# Patient Record
Sex: FEMALE | Employment: UNEMPLOYED | URBAN - NONMETROPOLITAN AREA
[De-identification: names, ages, dates, MRNs, and addresses within clinical notes are randomized per-mention and may not be internally consistent; named-entity substitution may affect disease eponyms.]

---

## 2021-09-04 ENCOUNTER — HOSPITAL ENCOUNTER (OUTPATIENT)
Age: 66
End: 2021-09-04
Payer: MEDICARE

## 2021-09-04 ENCOUNTER — OFFICE VISIT (OUTPATIENT)
Dept: PRIMARY CARE CLINIC | Age: 66
End: 2021-09-04
Payer: MEDICARE

## 2021-09-04 ENCOUNTER — HOSPITAL ENCOUNTER (OUTPATIENT)
Dept: GENERAL RADIOLOGY | Age: 66
Discharge: HOME | End: 2021-09-06
Payer: MEDICARE

## 2021-09-04 VITALS
WEIGHT: 114.5 LBS | BODY MASS INDEX: 20.29 KG/M2 | DIASTOLIC BLOOD PRESSURE: 90 MMHG | OXYGEN SATURATION: 96 % | SYSTOLIC BLOOD PRESSURE: 160 MMHG | HEART RATE: 72 BPM | HEIGHT: 63 IN

## 2021-09-04 DIAGNOSIS — L08.9: Primary | ICD-10-CM

## 2021-09-04 DIAGNOSIS — S91.332A: ICD-10-CM

## 2021-09-04 DIAGNOSIS — S91.332A: Primary | ICD-10-CM

## 2021-09-04 DIAGNOSIS — L08.9: ICD-10-CM

## 2021-09-04 PROCEDURE — 73630 X-RAY EXAM OF FOOT: CPT

## 2021-09-04 PROCEDURE — 99212 OFFICE O/P EST SF 10 MIN: CPT | Performed by: FAMILY MEDICINE

## 2021-09-04 PROCEDURE — 99204 OFFICE O/P NEW MOD 45 MIN: CPT | Performed by: FAMILY MEDICINE

## 2021-09-04 RX ORDER — DOXYCYCLINE HYCLATE 100 MG
100 TABLET ORAL 2 TIMES DAILY
Qty: 20 TABLET | Refills: 0 | Status: SHIPPED | OUTPATIENT
Start: 2021-09-04 | End: 2021-09-14

## 2021-09-04 RX ORDER — B-COMPLEX WITH VITAMIN C
1 TABLET ORAL 2 TIMES DAILY WITH MEALS
COMMUNITY
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
## 2021-09-05 ASSESSMENT — ENCOUNTER SYMPTOMS: COLOR CHANGE: 1

## 2024-06-01 ENCOUNTER — APPOINTMENT (OUTPATIENT)
Dept: CT IMAGING | Age: 69
End: 2024-06-01
Payer: MEDICARE

## 2024-06-01 ENCOUNTER — HOSPITAL ENCOUNTER (EMERGENCY)
Age: 69
Discharge: HOME | End: 2024-06-01
Attending: SPECIALIST
Payer: MEDICARE

## 2024-06-01 VITALS
RESPIRATION RATE: 20 BRPM | BODY MASS INDEX: 22.27 KG/M2 | HEART RATE: 72 BPM | OXYGEN SATURATION: 97 % | HEIGHT: 63 IN | WEIGHT: 125.7 LBS | SYSTOLIC BLOOD PRESSURE: 178 MMHG | DIASTOLIC BLOOD PRESSURE: 95 MMHG

## 2024-06-01 DIAGNOSIS — G51.0 BELL'S PALSY: Primary | ICD-10-CM

## 2024-06-01 PROCEDURE — 6370000000 HC RX 637 (ALT 250 FOR IP): Performed by: SPECIALIST

## 2024-06-01 PROCEDURE — 70450 CT HEAD/BRAIN W/O DYE: CPT

## 2024-06-01 PROCEDURE — 99284 EMERGENCY DEPT VISIT MOD MDM: CPT

## 2024-06-01 RX ORDER — ACETAMINOPHEN 500 MG
1000 TABLET ORAL ONCE
Status: COMPLETED | OUTPATIENT
Start: 2024-06-01 | End: 2024-06-01

## 2024-06-01 RX ADMIN — ACETAMINOPHEN 1000 MG: 500 TABLET ORAL at 21:40

## 2024-06-01 ASSESSMENT — PAIN - FUNCTIONAL ASSESSMENT
PAIN_FUNCTIONAL_ASSESSMENT: 0-10
PAIN_FUNCTIONAL_ASSESSMENT: PREVENTS OR INTERFERES SOME ACTIVE ACTIVITIES AND ADLS

## 2024-06-01 ASSESSMENT — PAIN DESCRIPTION - DESCRIPTORS
DESCRIPTORS: SHOOTING
DESCRIPTORS: SHOOTING

## 2024-06-01 ASSESSMENT — PAIN DESCRIPTION - LOCATION
LOCATION: HEAD
LOCATION: HEAD

## 2024-06-01 ASSESSMENT — PAIN SCALES - GENERAL
PAINLEVEL_OUTOF10: 8
PAINLEVEL_OUTOF10: 8

## 2024-06-01 ASSESSMENT — PAIN DESCRIPTION - ORIENTATION
ORIENTATION: RIGHT
ORIENTATION: RIGHT

## 2024-06-01 ASSESSMENT — PAIN DESCRIPTION - FREQUENCY: FREQUENCY: CONTINUOUS

## 2024-06-02 NOTE — ED TRIAGE NOTES
Patient had acupuncture today and states they removed some blood from her head. It helped with headache some but headache has returned.

## 2024-06-02 NOTE — DISCHARGE INSTRUCTIONS
Please understand that at this time there is no evidence for a more serious underlying process, but that early in the process of an illness or injury, an emergency department workup can be falsely reassuring.  You should contact your family doctor within the next 48 hours for a follow up appointment    THANK YOU!!!    From [facility] and [facility]    On behalf of the Emergency Department staff at [facility], I would like to thank you for giving us the opportunity to address your health care needs and concerns.    We hope that during your visit, our service was delivered in a professional and caring manner. Please keep [facility] in mind as we walk with you down the path to your own personal wellness.     Please expect an automated text message or email from us so we can ask a few questions about your health and progress. Based on your answers, a clinician may call you back to offer help and instructions.    Please understand that early in the process of an illness or injury, an emergency department workup can be falsely reassuring.  If you notice any worsening, changing or persistent symptoms please call your family doctor or return to the ER immediately.     Tell us how we did during your visit at [email or web address]   and let us know about your experience

## 2024-06-02 NOTE — ED PROVIDER NOTES
weight based adjustment of the mA/kV was utilized to reduce the radiation dose to as low as reasonably achievable. COMPARISON: None HISTORY: ORDERING SYSTEM PROVIDED HISTORY: Right facial droop TECHNOLOGIST PROVIDED HISTORY: Right facial droop Decision Support Exception - unselect if not a suspected or confirmed emergency medical condition->Emergency Medical Condition (MA) Reason for Exam: Right facial droop for 1 week, HA FINDINGS: BRAIN/VENTRICLES:  No masses nor acute intracranial hemorrhage.  Intact gray/white matter differentiation without findings of acute ischemia.  No mass effect nor midline shift.  Patent basilar cisterns and foramen magnum. No hydrocephalus.  Minimal cerebral atrophy.  Minimal deep and periventricular white matter hypodensities likely due to chronic small vessel ischemia.  Physiologic bilateral basal ganglia calcifications. ORBITS:  Normal without acute abnormality. SINUSES:  No acute abnormality.  Hypoplastic right frontal sinus. SOFT TISSUES/SKULL:  No acute soft tissue abnormality.  Moderate atherosclerotic calcifications.  No acute fracture.     No acute intracranial abnormality.          ED BEDSIDE ULTRASOUND:       LABS:  Labs Reviewed - No data to display      EMERGENCY DEPARTMENT COURSE:   Vitals:    Vitals:    06/01/24 2109   BP: (!) 178/95   Pulse: 72   Resp: 20   SpO2: 97%   Weight: 57 kg (125 lb 11.2 oz)   Height: 1.61 m (5' 3.39\")     -------------------------  BP: (!) 178/95,  , Pulse: 72, Respirations: 20    Orders Placed This Encounter   Medications    acetaminophen (TYLENOL) tablet 1,000 mg       During the ED course, patient remained hemodynamically stable and did not develop any new neurological deficits.  Patient has peripheral right facial nerve palsy and CT scan of the brain is unremarkable.  Plan is to discharge the patient with instructions to continue current medications, take acyclovir as prescribed, Tylenol as needed for the headache, use artificial teardrops and

## 2024-07-25 ENCOUNTER — OFFICE VISIT (OUTPATIENT)
Dept: VASCULAR SURGERY | Age: 69
End: 2024-07-25
Payer: MEDICARE

## 2024-07-25 VITALS
HEIGHT: 63 IN | HEART RATE: 74 BPM | BODY MASS INDEX: 22.15 KG/M2 | WEIGHT: 125 LBS | DIASTOLIC BLOOD PRESSURE: 80 MMHG | SYSTOLIC BLOOD PRESSURE: 118 MMHG

## 2024-07-25 DIAGNOSIS — I65.03 ASYMPTOMATIC VERTEBRAL ARTERY STENOSIS, BILATERAL: Primary | ICD-10-CM

## 2024-07-25 PROCEDURE — 99211 OFF/OP EST MAY X REQ PHY/QHP: CPT | Performed by: SURGERY

## 2024-07-25 RX ORDER — ASPIRIN 81 MG/1
81 TABLET, CHEWABLE ORAL DAILY
COMMUNITY

## 2024-07-25 RX ORDER — CLOPIDOGREL BISULFATE 75 MG/1
75 TABLET ORAL DAILY
COMMUNITY

## 2024-07-25 RX ORDER — AMLODIPINE BESYLATE 5 MG/1
5 TABLET ORAL DAILY
COMMUNITY
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
